# Patient Record
(demographics unavailable — no encounter records)

---

## 2024-11-26 NOTE — PHYSICAL EXAM
[5___] : hamstring 5[unfilled]/5 [Right] : right knee [Left] : left knee [AP Standing] : anteroposterior standing [] : no extensor lag [de-identified] : Patella tracking laterally. [FreeTextEntry9] : Lateral joint space narrowing. Sclerosis of the lateral knee. Valgus deformity.     . Tricompartmental osteophyte formation. No fractures seen.      [TWNoteComboBox7] : flexion 130 degrees [de-identified] : extension 0 degrees

## 2024-11-26 NOTE — HISTORY OF PRESENT ILLNESS
[de-identified] : Date of Injury/Onset: BILATERAL KNEE PAIN for 7 years, C/O CONSTANT ACHING PAIN WITH SIT TO STAND Mechanism of injury: NKI Have you been treated for this in the past? NO Have you had surgery for this in the past? NO Physical Therapy/ HEP: No PT, Was walking regularly and using a seated foot stepper OTC Medicines: Blue Emu Topical cream (LIDOCAINE), minimal relief RX medicines: None  Heat, Ice, Elevation: None CSI or Gel Injections:  None Other Previous Treatment:  Prior Imaging/Studies: MRI Rt knee 2017   Pain: At Rest: 0/10 With Activity: 0/10 Quality of symptoms: Patient reports that there is a constant aching discomfort   Affecting Sleep: Yes Stiffness upon waking, lasting greater than 30mins: Yes Difficulty with stairs: Yes Difficulty getting in and out of car: Yes Sit to stand stiffness: Yes Affects walking short/long distances? Yes Home/Work/Recreation affected? Yes   Improves with: Rest Worse with: Sit to stand   This is not a Work-Related Injury being treated under Worker's Compensation. This is not an athletic injury occurring associated with an interscholastic or organized sports team.

## 2024-11-26 NOTE — DATA REVIEWED
[MRI] : MRI [Right] : of the right [I independently reviewed and interpreted images and report] : I independently reviewed and interpreted images and report [FreeTextEntry1] : Medial and lateral knee pain

## 2024-11-26 NOTE — DISCUSSION/SUMMARY
[de-identified] : The patient was advised of the diagnosis. The natural history of the pathology was explained in full to the patient in layman's terms. Several different treatment options were discussed and explained in full to the patient including the risks and benefits of both surgical and non-surgical treatments. All questions and concerns were answered.  Lengthy discussion regarding options was had with the patient. Nonsurgical options including but not limited to cortisone, Visco supplementation, Prescription anti-inflammatory medications (both steroidal and non-steroidal), activity modification, non-impact exercise, maintaining a healthy BMI, bracing, and icing were reviewed. Surgical options including but not limited to arthroscopy, and joint replacement were discussed as was risks, benefits and alternatives of all the proposed treatments. Discussed also with the patient that they could also delay any immediate treatment options, and continue to observe and self care for the discussed problem. Discussed HEP as well as Rest, Ice and elevation. Patient had ample time to ask any questions about todays visit and the diagnosis, and all questions were answered. Patient understands the plan going forward.  I spent time going in detail the problem and the associated risks/benefits of surgery. Went into detailed discussion of complications including but not limited to, nerve injury, non-union, repeat fracture, DVT /PE /pe postop, instability, transfusion, infection, NVA injury, stiffness, leg length discrepancy, inability to ambulate & death. Discussed implant and model shown to patient. Patient had ample time to ask any questions, and at this time answered all patient questions, should anymore arise they were instructed to follow up. Patient expressed understanding of the proposed procedure and postop directions.   At this time, discussed at length that patient will need TKA in the future, patient reports that she would like to delay this at this time.  At this time after discussion of the options, the patient would benefit from organized Physical Therapy to increase overall functionality. The patient was provided with an Rx in office today and was instructed to attend PT for 6-8 weeks in order to increase strength and ROM. Patient agreed with this plan and will begin PT as soon as they can.  Patient was offered Meloxicam at this time, patient declined citing she is concerned about her kidneys at this time.   At this time also discussed HA vs CSI injections. Patient elected to delay these options at this time.   Plan is for PT at this time Patient will follow up in 3 months or PRN

## 2024-11-26 NOTE — REASON FOR VISIT
[FreeTextEntry1] : PSA: 1.31 (baseline)\par A1c: 6.7 (previously 7.2)\par Urine microalbumin: 37\par \par #1 type 2 diabetes: Controlled on current regimen. Recommend patient continue with current medications. Repeat A1c in 3 months.\par #2 slightly increased protein in urine and recommend a repeat urine sample in 3 months to determine chronicity.
[FreeTextEntry2] : Bilateral knee pain